# Patient Record
Sex: FEMALE | HISPANIC OR LATINO
[De-identification: names, ages, dates, MRNs, and addresses within clinical notes are randomized per-mention and may not be internally consistent; named-entity substitution may affect disease eponyms.]

---

## 2024-07-16 ENCOUNTER — APPOINTMENT (OUTPATIENT)
Dept: PEDIATRIC ORTHOPEDIC SURGERY | Facility: CLINIC | Age: 44
End: 2024-07-16
Payer: COMMERCIAL

## 2024-07-16 VITALS — TEMPERATURE: 96.6 F | BODY MASS INDEX: 24.15 KG/M2 | HEIGHT: 60 IN | WEIGHT: 123 LBS

## 2024-07-16 DIAGNOSIS — M54.16 RADICULOPATHY, LUMBAR REGION: ICD-10-CM

## 2024-07-16 PROBLEM — Z00.00 ENCOUNTER FOR PREVENTIVE HEALTH EXAMINATION: Status: ACTIVE | Noted: 2024-07-16

## 2024-07-16 PROCEDURE — 72100 X-RAY EXAM L-S SPINE 2/3 VWS: CPT

## 2024-07-16 PROCEDURE — 99203 OFFICE O/P NEW LOW 30 MIN: CPT

## 2024-07-16 RX ORDER — DICLOFENAC SODIUM 75 MG/1
75 TABLET, DELAYED RELEASE ORAL
Qty: 60 | Refills: 1 | Status: ACTIVE | COMMUNITY
Start: 2024-07-16 | End: 1900-01-01

## 2024-08-13 ENCOUNTER — APPOINTMENT (OUTPATIENT)
Dept: PEDIATRIC ORTHOPEDIC SURGERY | Facility: CLINIC | Age: 44
End: 2024-08-13
Payer: COMMERCIAL

## 2024-08-13 VITALS — HEIGHT: 60 IN | BODY MASS INDEX: 24.15 KG/M2 | TEMPERATURE: 97 F | WEIGHT: 123 LBS

## 2024-08-13 VITALS — BODY MASS INDEX: 25.2 KG/M2 | WEIGHT: 125 LBS | HEIGHT: 59 IN | TEMPERATURE: 96.6 F

## 2024-08-13 DIAGNOSIS — M54.16 RADICULOPATHY, LUMBAR REGION: ICD-10-CM

## 2024-08-13 PROCEDURE — 20552 NJX 1/MLT TRIGGER POINT 1/2: CPT | Mod: RT

## 2024-08-13 PROCEDURE — 99213 OFFICE O/P EST LOW 20 MIN: CPT | Mod: 25

## 2024-08-13 NOTE — PHYSICAL EXAM
[de-identified] : On physical examination patient has right lumbar muscle spasm and tenderness.  Right lateral bending increases her pain.  Straight leg raising is positive on the right at 50 degrees.  Motor or sensory and deep tendon reflex examination of both lower extremities is within normal limits.

## 2024-08-13 NOTE — HISTORY OF PRESENT ILLNESS
[de-identified] : This 44-year-old female returns with continued complaints of back and right leg pain and numbness.  Patient has not responded to anti-inflammatory medication, physical therapy or a trial of prednisone.  She is having difficulty with sleeping and ambulating.

## 2024-08-13 NOTE — ASSESSMENT
[FreeTextEntry1] : Right lumbar radiculitis  We have seeking authorization for an MRI of the lumbosacral spine without contrast because she has failed conservative treatment thus far.  She will return after the MRI has been obtained.